# Patient Record
Sex: FEMALE | Race: WHITE | Employment: OTHER | URBAN - METROPOLITAN AREA
[De-identification: names, ages, dates, MRNs, and addresses within clinical notes are randomized per-mention and may not be internally consistent; named-entity substitution may affect disease eponyms.]

---

## 2017-03-02 ENCOUNTER — HOSPITAL ENCOUNTER (OUTPATIENT)
Age: 72
Discharge: HOME | End: 2017-03-02
Attending: EMERGENCY MEDICINE
Payer: MEDICARE

## 2017-03-02 ENCOUNTER — APPOINTMENT (OUTPATIENT)
Dept: GENERAL RADIOLOGY | Age: 72
End: 2017-03-02
Attending: EMERGENCY MEDICINE
Payer: MEDICARE

## 2017-03-02 VITALS
SYSTOLIC BLOOD PRESSURE: 138 MMHG | WEIGHT: 125 LBS | TEMPERATURE: 99 F | BODY MASS INDEX: 20.09 KG/M2 | HEART RATE: 78 BPM | RESPIRATION RATE: 18 BRPM | DIASTOLIC BLOOD PRESSURE: 68 MMHG | HEIGHT: 66 IN | OXYGEN SATURATION: 100 %

## 2017-03-02 DIAGNOSIS — N30.01 ACUTE CYSTITIS WITH HEMATURIA: ICD-10-CM

## 2017-03-02 DIAGNOSIS — R10.9 ABDOMINAL PAIN, ACUTE: Primary | ICD-10-CM

## 2017-03-02 LAB
URINE BILIRUBIN: NEGATIVE
URINE COLOR: YELLOW
URINE GLUCOSE: NEGATIVE MG/DL
URINE KETONES: NEGATIVE MG/DL
URINE NITRITE: NEGATIVE
URINE PH: 6
URINE SPECIFIC GRAVITY: 1.01
URINE UROBILINOGEN: 0.2 MG/DL

## 2017-03-02 PROCEDURE — 99204 OFFICE O/P NEW MOD 45 MIN: CPT

## 2017-03-02 PROCEDURE — 87086 URINE CULTURE/COLONY COUNT: CPT | Performed by: EMERGENCY MEDICINE

## 2017-03-02 PROCEDURE — 74000 XR ABDOMEN (KUB) (1 AP VIEW)  (CPT=74000): CPT

## 2017-03-02 RX ORDER — LEVOTHYROXINE SODIUM 88 UG/1
TABLET ORAL
Refills: 2 | COMMUNITY
Start: 2016-12-26 | End: 2017-03-07

## 2017-03-02 RX ORDER — CEPHALEXIN 500 MG/1
500 CAPSULE ORAL 3 TIMES DAILY
Qty: 21 CAPSULE | Refills: 0 | Status: SHIPPED | OUTPATIENT
Start: 2017-03-02 | End: 2017-03-09

## 2017-03-02 NOTE — ED PROVIDER NOTES
Patient presents with:  Constipation (gastrointestinal)      HPI:     Obdulia Nair is a 70year old female who presents for evaluation of GI symptoms . These  include  abdominal pain and constipation. . Location: Patient complains of lower abdominal pain w there is no tenderness on palpation no pulsatile masses present there is a lower abdominal incision well-healed in midline. On rectal exam no external hemorrhoids, positive skin tags present.   On digital exam small amount of hard stool felt with examining Approved by (CST): Shubham Ramirez M.D. on 3/02/2017 at 14:48          3/2/2017  CONCLUSION:  1. Moderate stool throughout the colon consistent with constipation/fecal retention. 2. Small 4 mm calculus interpolar region right kidney suspected.

## 2017-03-02 NOTE — ED INITIAL ASSESSMENT (HPI)
Reports irregular bowel habits for the last 9 days. Reports feeling constipated at times. Dr. Nuha Long at the bedside. Reports intermittent pain with voiding. Denies any current symptoms. Reports last bm yesterday.

## 2017-03-07 PROBLEM — R41.3 MEMORY LOSS: Status: ACTIVE | Noted: 2017-03-07

## 2017-03-07 PROBLEM — K59.09 CHRONIC CONSTIPATION: Status: ACTIVE | Noted: 2017-03-07

## 2017-03-07 PROBLEM — E03.9 ACQUIRED HYPOTHYROIDISM: Status: ACTIVE | Noted: 2017-03-07

## 2017-10-28 PROCEDURE — 82607 VITAMIN B-12: CPT | Performed by: INTERNAL MEDICINE

## 2017-12-21 ENCOUNTER — LAB ENCOUNTER (OUTPATIENT)
Dept: LAB | Age: 72
End: 2017-12-21
Attending: Other
Payer: MEDICARE

## 2017-12-21 DIAGNOSIS — F32.A DEPRESSION: Primary | ICD-10-CM

## 2017-12-21 PROCEDURE — 84295 ASSAY OF SERUM SODIUM: CPT

## 2018-01-11 PROBLEM — C44.519 BASAL CELL CARCINOMA (BCC) OF BACK: Status: ACTIVE | Noted: 2018-01-11

## 2018-01-11 PROBLEM — R63.4 WEIGHT LOSS: Status: ACTIVE | Noted: 2018-01-11

## 2018-01-11 PROBLEM — N94.9 VAGINAL DISCOMFORT: Status: ACTIVE | Noted: 2018-01-11

## 2018-01-11 PROBLEM — N39.3 URINARY, INCONTINENCE, STRESS FEMALE: Status: ACTIVE | Noted: 2018-01-11

## 2018-01-11 PROBLEM — R41.3 MEMORY LOSS: Status: RESOLVED | Noted: 2017-03-07 | Resolved: 2018-01-11

## 2018-01-11 PROCEDURE — 81003 URINALYSIS AUTO W/O SCOPE: CPT | Performed by: INTERNAL MEDICINE

## 2019-11-07 PROBLEM — H93.13 TINNITUS AURIUM, BILATERAL: Status: ACTIVE | Noted: 2019-11-07

## 2019-11-07 PROBLEM — J31.0 CHRONIC RHINITIS: Status: ACTIVE | Noted: 2019-11-07

## 2019-11-07 PROBLEM — H53.69 DIMINISHED NIGHT VISION: Status: ACTIVE | Noted: 2019-11-07

## 2019-11-07 PROBLEM — E55.9 VITAMIN D DEFICIENCY: Status: ACTIVE | Noted: 2019-11-07

## 2019-11-07 PROBLEM — F33.0 MILD EPISODE OF RECURRENT MAJOR DEPRESSIVE DISORDER (HCC): Status: ACTIVE | Noted: 2019-11-07

## 2019-11-07 PROBLEM — R68.89 LIGHT SENSITIVITY: Status: ACTIVE | Noted: 2019-11-07

## 2020-11-29 PROBLEM — R63.4 WEIGHT LOSS: Status: RESOLVED | Noted: 2018-01-11 | Resolved: 2020-11-29

## 2020-11-29 PROBLEM — H93.13 TINNITUS AURIUM, BILATERAL: Status: RESOLVED | Noted: 2019-11-07 | Resolved: 2020-11-29

## 2020-11-29 PROBLEM — N94.9 VAGINAL DISCOMFORT: Status: RESOLVED | Noted: 2018-01-11 | Resolved: 2020-11-29

## 2021-04-10 ENCOUNTER — HOSPITAL ENCOUNTER (OUTPATIENT)
Age: 76
Discharge: HOME | End: 2021-04-10
Attending: EMERGENCY MEDICINE
Payer: MEDICARE

## 2021-04-10 VITALS
DIASTOLIC BLOOD PRESSURE: 62 MMHG | RESPIRATION RATE: 18 BRPM | HEART RATE: 71 BPM | TEMPERATURE: 98 F | SYSTOLIC BLOOD PRESSURE: 144 MMHG | OXYGEN SATURATION: 98 %

## 2021-04-10 DIAGNOSIS — B02.9 HERPES ZOSTER WITHOUT COMPLICATION: Primary | ICD-10-CM

## 2021-04-10 PROCEDURE — 99203 OFFICE O/P NEW LOW 30 MIN: CPT

## 2021-04-10 RX ORDER — VALACYCLOVIR HYDROCHLORIDE 1 G/1
1 TABLET, FILM COATED ORAL 3 TIMES DAILY
Qty: 21 TABLET | Refills: 0 | Status: SHIPPED | OUTPATIENT
Start: 2021-04-10 | End: 2021-04-17

## 2021-04-10 NOTE — ED INITIAL ASSESSMENT (HPI)
PATIENT ARRIVED AMBULATORY TO ROOM C/O A RASH TO THE RIGHT INNER THIGH. PATIENT STATES SHE NOTICED THE RASH 2 DAYS AGO. NO DRAINAGE.  NO FEVERS

## 2021-04-10 NOTE — ED PROVIDER NOTES
Patient Seen in: [facility]      History   Patient presents with:  Rash Skin Problem    Stated Complaint: TL-shingles outbreak    HPI/Subjective:   HPI    The patient is a 26-year-old female with past history of hypothyroidism who presents no oriented ×3.   The patient's motor strength is 5 out of 5 and symmetric in the upper and lower extremities bilaterally  Extremities: No focal swelling or tenderness  Skin: Dermatomal erythematous rash with small vesicles in the approximately L3 distribution

## 2021-04-16 PROBLEM — F33.1 MDD (MAJOR DEPRESSIVE DISORDER), RECURRENT EPISODE, MODERATE (HCC): Status: ACTIVE | Noted: 2021-04-16

## (undated) NOTE — ED AVS SNAPSHOT
[facility] AND [facility] in [address] [address] Keshav Currie    Phone:  [phone]    Fax:  [phone]           [place]    MRN: [id number]    Department:  [facility] in [address] [place] [address]   Date of Visit:  3/2 deductible, co-payment, or co-insurance and for other services not covered under your health insurance plan. Please contact your insurance company and physician's office to determine coverage and benefits available for follow-up care and referrals.      It continue to take your medications as instructed by your Primary Care doctor until you can check with your doctor. Please bring the medication list to your next doctor's appointment.     Any imaging studies and labs completed today can be reviewed in your M can help with your Affordable Care Act coverage, as well as all types of Medicaid plans. To get signed up and covered, please call [phone] and ask to get set up for an insurance coverage that is in-network with [facility]